# Patient Record
Sex: FEMALE | URBAN - METROPOLITAN AREA
[De-identification: names, ages, dates, MRNs, and addresses within clinical notes are randomized per-mention and may not be internally consistent; named-entity substitution may affect disease eponyms.]

---

## 2022-01-13 ENCOUNTER — OFFICE VISIT (OUTPATIENT)
Dept: URBAN - METROPOLITAN AREA CLINIC 1 | Facility: CLINIC | Age: 77
End: 2022-01-13
Payer: MEDICARE

## 2022-01-13 DIAGNOSIS — H47.233 GLAUCOMATOUS OPTIC ATROPHY, BILATERAL: ICD-10-CM

## 2022-01-13 DIAGNOSIS — H40.1132 PRIMARY OPEN-ANGLE GLAUCOMA, MODERATE STAGE, BILATERAL: ICD-10-CM

## 2022-01-13 PROCEDURE — 99203 OFFICE O/P NEW LOW 30 MIN: CPT | Performed by: OPHTHALMOLOGY

## 2022-01-13 ASSESSMENT — INTRAOCULAR PRESSURE
OS: 20
OD: 16

## 2022-01-13 ASSESSMENT — VISUAL ACUITY
OS: 20/30
OD: 20/30

## 2022-01-13 NOTE — IMPRESSION/PLAN
Impression: Age-related nuclear cataract, bilateral: H25.13. Plan: Cataracts account for the patient's complaints. Discussed all risks, benefits, procedures and recovery. Patient understands changing glasses will not improve vision. Patient desires to have surgery, recommend phacoemulsification with intraocular lens. pt wants to cme back in 3 mos RV wkup then LCT

## 2022-01-13 NOTE — IMPRESSION/PLAN
Impression: Primary open-angle glaucoma, moderate stage, bilateral: H40.1132. Plan: Intraocular pressure well controlled, tolerating medications. Will continue with same regimen.   will consider Istent with cat surgery

## 2022-04-08 DIAGNOSIS — H25.13 AGE-RELATED NUCLEAR CATARACT, BILATERAL: Primary | ICD-10-CM

## 2022-04-08 PROCEDURE — 92286 ANT SGM IMG I&R SPECLR MIC: CPT | Performed by: OPHTHALMOLOGY

## 2022-04-08 RX ORDER — OMEPRAZOLE 40 MG/1
40 MG CAPSULE, DELAYED RELEASE ORAL
Qty: 0 | Refills: 0 | Status: ACTIVE
Start: 2022-04-08

## 2022-04-08 RX ORDER — ASPIRIN 81 MG/1
81 MG TABLET ORAL
Qty: 0 | Refills: 0 | Status: ACTIVE
Start: 2022-04-08

## 2022-04-08 RX ORDER — LEVOTHYROXINE SODIUM 75 UG/1
CAPSULE ORAL
Qty: 0 | Refills: 0 | Status: ACTIVE
Start: 2022-04-08

## 2022-04-13 ENCOUNTER — OFFICE VISIT (OUTPATIENT)
Dept: URBAN - METROPOLITAN AREA CLINIC 1 | Facility: CLINIC | Age: 77
End: 2022-04-13
Payer: MEDICARE

## 2022-04-13 DIAGNOSIS — H25.13 AGE-RELATED NUCLEAR CATARACT, BILATERAL: Primary | ICD-10-CM

## 2022-04-13 PROCEDURE — 99214 OFFICE O/P EST MOD 30 MIN: CPT | Performed by: OPHTHALMOLOGY

## 2022-04-13 NOTE — IMPRESSION/PLAN
Impression: Age-related nuclear cataract, bilateral: H25.13. Plan: Plan to perform cataract surgery in the right / left eye: Discussed the risks, benefits, and alternatives of cataract surgery. The patient stated a full understanding and a desire to proceed with the procedure. Biometry ordered for intraocular lens selection. Advised against driving until complete. Reviewed the increased risk of retinal detachment after cataract surgery and reviewed retinal detachment and general warnings and to contact us immediately should any of those develop. CHEST/LUNG CLEARED ON TODAYS VISIT. 

OD 1ST EYE; SN60WF 21.0 ISTENT OU; NO UPGRADE; 2/2 COMA AL 22.88 ; SURGERY DROPS; PLANO OU

## 2022-05-10 ENCOUNTER — PROCEDURE (OUTPATIENT)
Dept: URBAN - METROPOLITAN AREA SURGERY 1 | Facility: SURGERY | Age: 77
End: 2022-05-10
Payer: MEDICARE

## 2022-05-10 ENCOUNTER — Encounter (OUTPATIENT)
Dept: URBAN - METROPOLITAN AREA SURGERY 2 | Facility: SURGERY | Age: 77
End: 2022-05-10
Payer: MEDICARE

## 2022-05-10 PROCEDURE — 66991 XCAPSL CTRC RMVL INSJ 1+: CPT | Performed by: OPHTHALMOLOGY

## 2022-05-11 ENCOUNTER — POST-OPERATIVE VISIT (OUTPATIENT)
Dept: URBAN - METROPOLITAN AREA CLINIC 3 | Facility: CLINIC | Age: 77
End: 2022-05-11
Payer: MEDICARE

## 2022-05-11 PROCEDURE — 99024 POSTOP FOLLOW-UP VISIT: CPT | Performed by: OPTOMETRIST

## 2022-05-11 ASSESSMENT — INTRAOCULAR PRESSURE
OS: 11
OD: 10

## 2022-05-11 NOTE — IMPRESSION/PLAN
Impression: S/P CE/Standard IOL OD - 1 Day. Encounter for surgical aftercare following surgery on a sense organ  Z48.810. Post operative instructions reviewed - Condition is improving - Cataract OS. Plan: Use Ciprofloxacin, Ketorolac and Prednisolone TID for one week. Advised no heavy lifting or strenuous activity for one week. Advised no swimming for three weeks. Use eye shield at night for the first week. Patient advised to call immediately for any problems including, but not limited to, pain, redness, and decreased vision. Advised to bring eyedrops to each visit. Patient stated an understanding of all the instructions. --Advised patient to use artificial tears for comfort.

## 2022-05-18 ENCOUNTER — PRE-OPERATIVE VISIT (OUTPATIENT)
Dept: URBAN - METROPOLITAN AREA CLINIC 1 | Facility: CLINIC | Age: 77
End: 2022-05-18
Payer: MEDICARE

## 2022-05-18 DIAGNOSIS — H25.12 AGE-RELATED NUCLEAR CATARACT, LEFT EYE: ICD-10-CM

## 2022-05-18 DIAGNOSIS — H40.1122 PRIMARY OPEN-ANGLE GLAUCOMA, LEFT EYE, MODERATE STAGE: ICD-10-CM

## 2022-05-18 DIAGNOSIS — Z48.810 ENCOUNTER FOR SURGICAL AFTERCARE FOLLOWING SURGERY ON A SENSE ORGAN: Primary | ICD-10-CM

## 2022-05-18 PROCEDURE — 99024 POSTOP FOLLOW-UP VISIT: CPT | Performed by: OPHTHALMOLOGY

## 2022-05-18 ASSESSMENT — INTRAOCULAR PRESSURE
OS: 13
OD: 13

## 2022-05-18 NOTE — IMPRESSION/PLAN
Impression: Age-related nuclear cataract, left eye: H25.12. Plan: Plan to perform cataract surgery in the right / left eye: Discussed the risks, benefits, and alternatives of cataract surgery. The patient stated a full understanding and a desire to proceed with the procedure. Biometry ordered for intraocular lens selection. Advised against driving until complete. Reviewed the increased risk of retinal detachment after cataract surgery and reviewed retinal detachment and general warnings and to contact us immediately should any of those develop. 

OS second; SN60WF +21.0 with ISTENT; no upgrades; surgery drops

## 2022-05-18 NOTE — IMPRESSION/PLAN
Impression: S/P Cataract Extraction by phacoemulsification with IOL placement; iStent OD - 8 Days. Encounter for surgical aftercare following surgery on a sense organ  Z48.810. Plan: Patient to proceed with cataract surgery with Istent OS. Pt to stop Latanoprost QHS OD. --Continue all meds as instructed for OD. Patient to use Prednisolone QID OD and Ketorolac TID OD.

## 2022-05-24 ENCOUNTER — Encounter (OUTPATIENT)
Dept: URBAN - METROPOLITAN AREA SURGERY 2 | Facility: SURGERY | Age: 77
End: 2022-05-24
Payer: MEDICARE

## 2022-05-24 ENCOUNTER — PROCEDURE (OUTPATIENT)
Dept: URBAN - METROPOLITAN AREA SURGERY 1 | Facility: SURGERY | Age: 77
End: 2022-05-24
Payer: MEDICARE

## 2022-05-24 PROCEDURE — 66991 XCAPSL CTRC RMVL INSJ 1+: CPT | Performed by: OPHTHALMOLOGY

## 2022-05-25 ENCOUNTER — POST-OPERATIVE VISIT (OUTPATIENT)
Dept: URBAN - METROPOLITAN AREA CLINIC 1 | Facility: CLINIC | Age: 77
End: 2022-05-25
Payer: MEDICARE

## 2022-05-25 DIAGNOSIS — Z96.1 PRESENCE OF INTRAOCULAR LENS: Primary | ICD-10-CM

## 2022-05-25 PROCEDURE — 99024 POSTOP FOLLOW-UP VISIT: CPT | Performed by: OPHTHALMOLOGY

## 2022-05-25 ASSESSMENT — INTRAOCULAR PRESSURE
OD: 14
OS: 16

## 2022-05-25 NOTE — IMPRESSION/PLAN
Impression: S/P Cataract Extraction by phacoemulsification/IOL placement/iStent placement; iStent OS - 1 Day. Presence of intraocular lens  Z96.1. Plan: PATIENT GOT CORNEAL SCRATCH FROM THE DRAPE, DR JHA PUT IN A CONTACT LENS
WE WILL REMOVE CONTACT LENS TODAY PATIENT DOING WELL



RV 2 WKS  Pt to use surgery drops as instructed for surgery eye; Ciprofloxacin 1 drop 3 times a day, Ketorolac 1 drop 3 times a day & Prednisolone 1% 1 drop 3 times a day.

## 2022-06-13 ENCOUNTER — POST-OPERATIVE VISIT (OUTPATIENT)
Dept: URBAN - METROPOLITAN AREA CLINIC 1 | Facility: CLINIC | Age: 77
End: 2022-06-13
Payer: MEDICARE

## 2022-06-13 DIAGNOSIS — Z48.810 ENCOUNTER FOR SURGICAL AFTERCARE FOLLOWING SURGERY ON A SENSE ORGAN: Primary | ICD-10-CM

## 2022-06-13 PROCEDURE — 99024 POSTOP FOLLOW-UP VISIT: CPT | Performed by: OPHTHALMOLOGY

## 2022-06-13 ASSESSMENT — INTRAOCULAR PRESSURE
OS: 13
OD: 12

## 2022-06-13 NOTE — IMPRESSION/PLAN
Impression: S/P Cataract Extraction by phacoemulsification/IOL placement/iStent placement; iStent OS - 20 Days. Encounter for surgical aftercare following surgery on a sense organ  Z48.810.  Plan: patient doing well to use at's qid ou  PATIENT TO USE OTC READERS NO 

rv 2-3 weeks MRX

## 2022-06-30 ENCOUNTER — POST-OPERATIVE VISIT (OUTPATIENT)
Dept: URBAN - METROPOLITAN AREA CLINIC 1 | Facility: CLINIC | Age: 77
End: 2022-06-30
Payer: MEDICARE

## 2022-06-30 DIAGNOSIS — Z96.1 PRESENCE OF INTRAOCULAR LENS: Primary | ICD-10-CM

## 2022-06-30 PROCEDURE — 99024 POSTOP FOLLOW-UP VISIT: CPT | Performed by: OPHTHALMOLOGY

## 2022-06-30 ASSESSMENT — INTRAOCULAR PRESSURE
OD: 13
OS: 15

## 2022-06-30 NOTE — IMPRESSION/PLAN
Impression: S/P Cataract Extraction by phacoemulsification/IOL placement/iStent placement; iStent OS - 37 Days. Presence of intraocular lens  Z96.1. Plan: we went over the dryness and reminded pt to use at's qid and we will check the dryness and cornea surface in 1 month We will also eval and refer for lid bleph Dr Misha Hernandez --Advised patient to use artificial tears for comfort. bid or more

## 2022-08-04 ENCOUNTER — POST-OPERATIVE VISIT (OUTPATIENT)
Dept: URBAN - METROPOLITAN AREA CLINIC 1 | Facility: CLINIC | Age: 77
End: 2022-08-04
Payer: MEDICARE

## 2022-08-04 DIAGNOSIS — Z96.1 PRESENCE OF INTRAOCULAR LENS: Primary | ICD-10-CM

## 2022-08-04 PROCEDURE — 99024 POSTOP FOLLOW-UP VISIT: CPT | Performed by: OPHTHALMOLOGY

## 2022-08-04 ASSESSMENT — INTRAOCULAR PRESSURE
OS: 16
OD: 16

## 2022-08-04 NOTE — IMPRESSION/PLAN
Impression: S/P Cataract Extraction by phacoemulsification/IOL placement/iStent placement; iStent OS - 72 Days. Presence of intraocular lens  Z96.1.  Plan: patient is doing well, we will hold off on the refraction
patient to continue with at's qid ou
rv jan 2023

## 2023-01-11 ENCOUNTER — OFFICE VISIT (OUTPATIENT)
Dept: URBAN - METROPOLITAN AREA CLINIC 1 | Facility: CLINIC | Age: 78
End: 2023-01-11
Payer: MEDICARE

## 2023-01-11 DIAGNOSIS — H40.1132 PRIMARY OPEN-ANGLE GLAUCOMA, MODERATE STAGE, BILATERAL: ICD-10-CM

## 2023-01-11 DIAGNOSIS — Z96.1 PRESENCE OF INTRAOCULAR LENS: Primary | ICD-10-CM

## 2023-01-11 PROCEDURE — 99213 OFFICE O/P EST LOW 20 MIN: CPT | Performed by: OPHTHALMOLOGY

## 2023-01-11 PROCEDURE — 92133 CPTRZD OPH DX IMG PST SGM ON: CPT | Performed by: OPHTHALMOLOGY

## 2023-01-11 ASSESSMENT — INTRAOCULAR PRESSURE
OS: 13
OD: 12

## 2023-01-11 ASSESSMENT — VISUAL ACUITY
OS: 20/25
OD: 20/20

## 2023-01-11 NOTE — IMPRESSION/PLAN
Impression: Primary open-angle glaucoma, moderate stage, bilateral: H40.1132. Plan: 

Patient s/p istent OU and controlled pressures, no drops needed. Continue to monitor.  photos

## 2023-08-10 ENCOUNTER — OFFICE VISIT (OUTPATIENT)
Dept: URBAN - METROPOLITAN AREA CLINIC 1 | Facility: CLINIC | Age: 78
End: 2023-08-10
Payer: MEDICARE

## 2023-08-10 DIAGNOSIS — H47.233 GLAUCOMATOUS OPTIC ATROPHY, BILATERAL: ICD-10-CM

## 2023-08-10 DIAGNOSIS — E11.9 DIABETES MELLITUS TYPE 2 WITHOUT MENTION OF COMPLICATION: ICD-10-CM

## 2023-08-10 DIAGNOSIS — H02.831 DERMATOCHALASIS OF RIGHT UPPER EYELID: ICD-10-CM

## 2023-08-10 DIAGNOSIS — H40.1132 PRIMARY OPEN-ANGLE GLAUCOMA, MODERATE STAGE, BILATERAL: Primary | ICD-10-CM

## 2023-08-10 DIAGNOSIS — Z96.1 PRESENCE OF INTRAOCULAR LENS: ICD-10-CM

## 2023-08-10 DIAGNOSIS — H02.834 DERMATOCHALASIS OF LEFT UPPER EYELID: ICD-10-CM

## 2023-08-10 DIAGNOSIS — H04.123 TEAR FILM INSUFFICIENCY OF BILATERAL LACRIMAL GLANDS: ICD-10-CM

## 2023-08-10 PROCEDURE — 92250 FUNDUS PHOTOGRAPHY W/I&R: CPT | Performed by: OPHTHALMOLOGY

## 2023-08-10 PROCEDURE — 99212 OFFICE O/P EST SF 10 MIN: CPT | Performed by: OPHTHALMOLOGY

## 2023-08-10 ASSESSMENT — INTRAOCULAR PRESSURE
OS: 19
OD: 16

## 2024-02-02 ENCOUNTER — TECH ONLY (OUTPATIENT)
Dept: URBAN - METROPOLITAN AREA CLINIC 1 | Facility: CLINIC | Age: 79
End: 2024-02-02
Payer: MEDICARE

## 2024-02-02 DIAGNOSIS — H40.1132 PRIMARY OPEN-ANGLE GLAUCOMA, BILATERAL, MODERATE STAGE: Primary | ICD-10-CM

## 2024-02-13 ENCOUNTER — OFFICE VISIT (OUTPATIENT)
Dept: URBAN - METROPOLITAN AREA CLINIC 1 | Facility: CLINIC | Age: 79
End: 2024-02-13
Payer: MEDICARE

## 2024-02-13 DIAGNOSIS — Z96.1 PRESENCE OF INTRAOCULAR LENS: ICD-10-CM

## 2024-02-13 DIAGNOSIS — H16.223 BILATERAL KERATOCONJUNCTIVITIS SICCA, NOT SPECIFIED AS SJOGREN'S: ICD-10-CM

## 2024-02-13 DIAGNOSIS — E11.9 DIABETES MELLITUS TYPE 2 WITHOUT MENTION OF COMPLICATION: ICD-10-CM

## 2024-02-13 DIAGNOSIS — H47.233 GLAUCOMATOUS OPTIC ATROPHY, BILATERAL: ICD-10-CM

## 2024-02-13 PROCEDURE — 99213 OFFICE O/P EST LOW 20 MIN: CPT | Performed by: OPHTHALMOLOGY

## 2024-02-13 RX ORDER — CYCLOSPORINE 0.5 MG/ML
0.05 % EMULSION OPHTHALMIC
Qty: 180 | Refills: 1 | Status: ACTIVE
Start: 2024-02-13

## 2024-02-13 ASSESSMENT — INTRAOCULAR PRESSURE
OS: 19
OD: 16

## 2024-08-14 ENCOUNTER — OFFICE VISIT (OUTPATIENT)
Dept: URBAN - METROPOLITAN AREA CLINIC 1 | Facility: CLINIC | Age: 79
End: 2024-08-14
Payer: MEDICARE

## 2024-08-14 DIAGNOSIS — E11.9 DIABETES MELLITUS TYPE 2 WITHOUT MENTION OF COMPLICATION: ICD-10-CM

## 2024-08-14 DIAGNOSIS — H47.233 GLAUCOMATOUS OPTIC ATROPHY, BILATERAL: ICD-10-CM

## 2024-08-14 DIAGNOSIS — H40.1132 PRIMARY OPEN-ANGLE GLAUCOMA, BILATERAL, MODERATE STAGE: Primary | ICD-10-CM

## 2024-08-14 DIAGNOSIS — Z96.1 PRESENCE OF INTRAOCULAR LENS: ICD-10-CM

## 2024-08-14 DIAGNOSIS — H16.223 BILATERAL KERATOCONJUNCTIVITIS SICCA, NOT SPECIFIED AS SJOGREN'S: ICD-10-CM

## 2024-08-14 PROCEDURE — 99213 OFFICE O/P EST LOW 20 MIN: CPT | Performed by: OPHTHALMOLOGY

## 2024-08-14 PROCEDURE — 92250 FUNDUS PHOTOGRAPHY W/I&R: CPT | Performed by: OPHTHALMOLOGY

## 2024-08-14 ASSESSMENT — INTRAOCULAR PRESSURE
OS: 18
OD: 15

## 2025-05-28 ENCOUNTER — OFFICE VISIT (OUTPATIENT)
Dept: URBAN - METROPOLITAN AREA CLINIC 1 | Facility: CLINIC | Age: 80
End: 2025-05-28
Payer: MEDICARE

## 2025-05-28 DIAGNOSIS — Z96.1 PRESENCE OF INTRAOCULAR LENS: ICD-10-CM

## 2025-05-28 DIAGNOSIS — H47.233 GLAUCOMATOUS OPTIC ATROPHY, BILATERAL: ICD-10-CM

## 2025-05-28 DIAGNOSIS — E11.9 DIABETES MELLITUS TYPE 2 WITHOUT MENTION OF COMPLICATION: ICD-10-CM

## 2025-05-28 DIAGNOSIS — H40.1132 PRIMARY OPEN-ANGLE GLAUCOMA, BILATERAL, MODERATE STAGE: Primary | ICD-10-CM

## 2025-05-28 PROCEDURE — 99213 OFFICE O/P EST LOW 20 MIN: CPT | Performed by: OPHTHALMOLOGY

## 2025-05-28 PROCEDURE — 92133 CPTRZD OPH DX IMG PST SGM ON: CPT | Performed by: OPHTHALMOLOGY

## 2025-05-28 ASSESSMENT — INTRAOCULAR PRESSURE
OS: 16
OD: 17